# Patient Record
Sex: MALE | Race: WHITE | NOT HISPANIC OR LATINO
[De-identification: names, ages, dates, MRNs, and addresses within clinical notes are randomized per-mention and may not be internally consistent; named-entity substitution may affect disease eponyms.]

---

## 2023-06-06 PROBLEM — Z00.00 ENCOUNTER FOR PREVENTIVE HEALTH EXAMINATION: Status: ACTIVE | Noted: 2023-06-06

## 2023-06-22 ENCOUNTER — NON-APPOINTMENT (OUTPATIENT)
Age: 33
End: 2023-06-22

## 2023-06-23 ENCOUNTER — NON-APPOINTMENT (OUTPATIENT)
Age: 33
End: 2023-06-23

## 2023-06-23 ENCOUNTER — APPOINTMENT (OUTPATIENT)
Dept: COLORECTAL SURGERY | Facility: CLINIC | Age: 33
End: 2023-06-23
Payer: COMMERCIAL

## 2023-06-23 VITALS
WEIGHT: 175 LBS | DIASTOLIC BLOOD PRESSURE: 72 MMHG | SYSTOLIC BLOOD PRESSURE: 120 MMHG | HEIGHT: 73 IN | TEMPERATURE: 97.7 F | HEART RATE: 87 BPM | BODY MASS INDEX: 23.19 KG/M2

## 2023-06-23 DIAGNOSIS — L29.0 PRURITUS ANI: ICD-10-CM

## 2023-06-23 DIAGNOSIS — Z82.49 FAMILY HISTORY OF ISCHEMIC HEART DISEASE AND OTHER DISEASES OF THE CIRCULATORY SYSTEM: ICD-10-CM

## 2023-06-23 DIAGNOSIS — Z78.9 OTHER SPECIFIED HEALTH STATUS: ICD-10-CM

## 2023-06-23 PROCEDURE — 46600 DIAGNOSTIC ANOSCOPY SPX: CPT

## 2023-06-23 PROCEDURE — 99202 OFFICE O/P NEW SF 15 MIN: CPT | Mod: 25

## 2023-06-23 RX ORDER — FLUOCINOLONE ACETONIDE 0.25 MG/G
0.03 OINTMENT TOPICAL TWICE DAILY
Qty: 120 | Refills: 2 | Status: ACTIVE | COMMUNITY
Start: 2023-06-23 | End: 1900-01-01

## 2023-06-23 RX ORDER — IVERMECTIN 10 MG/G
1 CREAM TOPICAL
Refills: 0 | Status: ACTIVE | COMMUNITY

## 2023-06-23 RX ORDER — ACETAMINOPHEN 325 MG
TABLET ORAL
Refills: 0 | Status: ACTIVE | COMMUNITY

## 2023-06-23 RX ORDER — LORATADINE 5 MG/5 ML
10 SOLUTION, ORAL ORAL
Refills: 0 | Status: ACTIVE | COMMUNITY

## 2023-06-23 RX ORDER — MULTIVITAMIN
TABLET ORAL
Refills: 0 | Status: ACTIVE | COMMUNITY

## 2023-06-23 NOTE — PHYSICAL EXAM
[Normal Rate and Rhythm] : normal rate and rhythm [Alert] : alert [Oriented to Person] : oriented to person [Oriented to Place] : oriented to place [Oriented to Time] : oriented to time [Calm] : calm [Excoriation] : excoriations [Normal] : was normal [None] : there was no rectal mass  [JVD] : no jugular venous distention  [de-identified] : Moderate perianal erythema with superficial fissuring of the anal margin both right posterior and left posterior lateral [de-identified] : Well appearing [de-identified] : NCAT [FreeTextEntry1] : Anorectal examination:\par \par Inspection reveals 2 small tears on either side of the posterior midline. \par \par Digital rectal examination reveals no masses.\par \par Anoscopy reveals a moderately inflamed left lateral hemorrhoid.

## 2023-06-23 NOTE — HISTORY OF PRESENT ILLNESS
[FreeTextEntry1] : 32 y/o M presents for initial evaluation, referred by Dr. Obed Ochoa\par Reason for visit: "anal bleeding, check for hemorrhoids or fissure"\par \par H/o rosacea induced acne (on topical Ivermectin)\par Denies PSH\par \par Denies FMH of colorectal CA\par Never had a colonoscopy \par \par For the past several months the patient has experienced intermittent anal pain associated with bowel movements. He also has intermittent blood on toilet paper after bowel movements. In late April while on vacation he had a relatively large amount of bright red blood in the toilet bowel. This has happened once or twice since the trip. \par \par No changes in bowel habits recently. He usual goes 1-2 times per day and he has not been constipated and not had diarrhea.

## 2023-06-23 NOTE — ASSESSMENT
[FreeTextEntry1] : 33 year old healthy man with tears of the anal skin as well as inflamed left lateral internal hemorrhoid.\par \par The patient was instructed to increase fluid and fiber intake. Further, he was instructed to avoid soap and vigorously cleaning the anus.\par \par I will also prescribe steroid cream.\par \par If the patient continues to have bleeding and discomfort despite these measures, I will see the patient again in the office in 2 weeks. \par \par Advised the patient of the etiology and treatment of pruritus ani.  Recommendations including appropriate perianal hygiene changes, avoidance of soaps and astringents, lubricating lotions and avoidance of excessive wiping detailed.\par \par Advised increasing fiber regimen.\par \par A trial of topical steroid cream was recommended.\par \par Advised return in 3-4 weeks if symptoms persist for perianal biopsy of the skin.\par

## 2023-06-27 ENCOUNTER — TRANSCRIPTION ENCOUNTER (OUTPATIENT)
Age: 33
End: 2023-06-27

## 2024-08-31 ENCOUNTER — NON-APPOINTMENT (OUTPATIENT)
Age: 34
End: 2024-08-31